# Patient Record
Sex: FEMALE | Race: WHITE | NOT HISPANIC OR LATINO | Employment: OTHER | URBAN - METROPOLITAN AREA
[De-identification: names, ages, dates, MRNs, and addresses within clinical notes are randomized per-mention and may not be internally consistent; named-entity substitution may affect disease eponyms.]

---

## 2017-01-17 ENCOUNTER — ALLIED HEALTH/NURSE VISIT (OUTPATIENT)
Dept: NURSING | Facility: CLINIC | Age: 37
End: 2017-01-17
Payer: MEDICAID

## 2017-01-17 DIAGNOSIS — Z23 NEED FOR VACCINATION: Primary | ICD-10-CM

## 2017-01-17 PROCEDURE — 90471 IMMUNIZATION ADMIN: CPT

## 2017-01-17 PROCEDURE — 90715 TDAP VACCINE 7 YRS/> IM: CPT

## 2017-12-03 ENCOUNTER — HEALTH MAINTENANCE LETTER (OUTPATIENT)
Age: 37
End: 2017-12-03

## 2018-12-04 ENCOUNTER — HEALTH MAINTENANCE LETTER (OUTPATIENT)
Age: 38
End: 2018-12-04

## 2020-02-03 ENCOUNTER — OFFICE VISIT (OUTPATIENT)
Dept: FAMILY MEDICINE | Facility: CLINIC | Age: 40
End: 2020-02-03
Payer: COMMERCIAL

## 2020-02-03 VITALS
WEIGHT: 175 LBS | DIASTOLIC BLOOD PRESSURE: 70 MMHG | BODY MASS INDEX: 29.16 KG/M2 | HEIGHT: 65 IN | OXYGEN SATURATION: 97 % | TEMPERATURE: 98.1 F | SYSTOLIC BLOOD PRESSURE: 96 MMHG | HEART RATE: 78 BPM

## 2020-02-03 DIAGNOSIS — F33.1 MODERATE EPISODE OF RECURRENT MAJOR DEPRESSIVE DISORDER (H): ICD-10-CM

## 2020-02-03 DIAGNOSIS — Z13.1 SCREENING FOR DIABETES MELLITUS: ICD-10-CM

## 2020-02-03 DIAGNOSIS — R32 URINARY INCONTINENCE, UNSPECIFIED TYPE: ICD-10-CM

## 2020-02-03 DIAGNOSIS — N64.4 PAIN OF BOTH BREASTS: ICD-10-CM

## 2020-02-03 DIAGNOSIS — Z13.220 LIPID SCREENING: ICD-10-CM

## 2020-02-03 DIAGNOSIS — Z01.419 ENCOUNTER FOR GYNECOLOGICAL EXAMINATION WITHOUT ABNORMAL FINDING: ICD-10-CM

## 2020-02-03 DIAGNOSIS — Z00.00 ROUTINE GENERAL MEDICAL EXAMINATION AT A HEALTH CARE FACILITY: Primary | ICD-10-CM

## 2020-02-03 DIAGNOSIS — R06.83 SNORING: ICD-10-CM

## 2020-02-03 DIAGNOSIS — G47.9 SLEEP DIFFICULTIES: ICD-10-CM

## 2020-02-03 LAB
CHOLEST SERPL-MCNC: 256 MG/DL
GLUCOSE SERPL-MCNC: 92 MG/DL (ref 70–99)
HDLC SERPL-MCNC: 64 MG/DL
LDLC SERPL CALC-MCNC: 173 MG/DL
NONHDLC SERPL-MCNC: 192 MG/DL
TRIGL SERPL-MCNC: 96 MG/DL

## 2020-02-03 PROCEDURE — G0145 SCR C/V CYTO,THINLAYER,RESCR: HCPCS | Performed by: FAMILY MEDICINE

## 2020-02-03 PROCEDURE — 82947 ASSAY GLUCOSE BLOOD QUANT: CPT | Performed by: FAMILY MEDICINE

## 2020-02-03 PROCEDURE — G0476 HPV COMBO ASSAY CA SCREEN: HCPCS | Performed by: FAMILY MEDICINE

## 2020-02-03 PROCEDURE — 80061 LIPID PANEL: CPT | Performed by: FAMILY MEDICINE

## 2020-02-03 PROCEDURE — 87624 HPV HI-RISK TYP POOLED RSLT: CPT | Performed by: FAMILY MEDICINE

## 2020-02-03 PROCEDURE — 36415 COLL VENOUS BLD VENIPUNCTURE: CPT | Performed by: FAMILY MEDICINE

## 2020-02-03 PROCEDURE — 96127 BRIEF EMOTIONAL/BEHAV ASSMT: CPT | Performed by: FAMILY MEDICINE

## 2020-02-03 PROCEDURE — 99213 OFFICE O/P EST LOW 20 MIN: CPT | Mod: 25 | Performed by: FAMILY MEDICINE

## 2020-02-03 PROCEDURE — 99395 PREV VISIT EST AGE 18-39: CPT | Performed by: FAMILY MEDICINE

## 2020-02-03 RX ORDER — ESCITALOPRAM OXALATE 20 MG/1
TABLET ORAL
COMMUNITY
Start: 2019-12-11 | End: 2021-09-01

## 2020-02-03 ASSESSMENT — ENCOUNTER SYMPTOMS
NERVOUS/ANXIOUS: 0
HEMATURIA: 0
CONSTIPATION: 0
FEVER: 0
ABDOMINAL PAIN: 0
CHILLS: 0
EYE PAIN: 0
DIARRHEA: 0
DIZZINESS: 0
HEMATOCHEZIA: 0
COUGH: 0

## 2020-02-03 ASSESSMENT — MIFFLIN-ST. JEOR: SCORE: 1471.73

## 2020-02-03 ASSESSMENT — PATIENT HEALTH QUESTIONNAIRE - PHQ9: SUM OF ALL RESPONSES TO PHQ QUESTIONS 1-9: 7

## 2020-02-03 NOTE — PROGRESS NOTES
SUBJECTIVE:   CC: Gini Valenzuela is an 39 year old woman who presents for preventive health visit.     Healthy Habits:     Getting at least 3 servings of Calcium per day:  Yes    Bi-annual eye exam:  Yes    Dental care twice a year:  Yes    Sleep apnea or symptoms of sleep apnea:  Excessive snoring    Diet:  Regular (no restrictions)    Frequency of exercise:  1 day/week    Duration of exercise:  Less than 15 minutes    Taking medications regularly:  Yes    Medication side effects:  None    PHQ-2 Total Score: 2    Additional concerns today:  Yes  No apnea or excessive daytime drowsiness.   She is currently taking Lexapro 20 mg daily for MDD. She is being followed by psychiatrist.   Sometimes wakes up at night and has difficulty with sleep.     Today's PHQ-2 Score:   PHQ-2 ( 1999 Pfizer) 2/3/2020   Q1: Little interest or pleasure in doing things 1   Q2: Feeling down, depressed or hopeless 1   PHQ-2 Score 2   Q1: Little interest or pleasure in doing things Several days   Q2: Feeling down, depressed or hopeless Several days   PHQ-2 Score 2       Abuse: Current or Past(Physical, Sexual or Emotional)- No  Do you feel safe in your environment? Yes        Social History     Tobacco Use     Smoking status: Former Smoker     Smokeless tobacco: Never Used   Substance Use Topics     Alcohol use: No     Alcohol/week: 0.0 standard drinks         Alcohol Use 2/3/2020   Prescreen: >3 drinks/day or >7 drinks/week? No   Prescreen: >3 drinks/day or >7 drinks/week? -       Reviewed orders with patient.  Reviewed health maintenance and updated orders accordingly - Yes  Labs reviewed in EPIC    Mammogram not appropriate for this patient based on age.    Pertinent mammograms are reviewed under the imaging tab.  History of abnormal Pap smear: YES - updated in Problem List and Health Maintenance accordingly  PAP / HPV 5/16/2016 2/6/2014 1/6/2011   PAP NIL NIL NIL   PAP DATE - [facility] - - 1/6/11     Reviewed and updated as needed this visit  "by clinical staff       Reviewed and updated as needed this visit by Provider          Review of Systems   Constitutional: Negative for chills and fever.   HENT: Negative for congestion and ear pain.    Eyes: Negative for pain.   Respiratory: Negative for cough.    Cardiovascular: Negative for chest pain.   Gastrointestinal: Negative for abdominal pain, constipation, diarrhea and hematochezia.   Genitourinary: Negative for hematuria.   Neurological: Negative for dizziness.   Psychiatric/Behavioral: The patient is not nervous/anxious.      Urinary incontinence - Since delivering three years ago she has been having bladder issues. She is currently wearing a pad every day due to incontinence.     She weaned breastfeeding in Sept 2019. For the last month, she has been experiencing breast soreness on the outer breasts. Pain is mild and constant. No lumps, skin changes or nipple drainage. She stopped breast feeding. Maternal first cousin has breast cancer.      OBJECTIVE:   Physical Exam   BP 96/70   Pulse 78   Temp 98.1  F (36.7  C) (Oral)   Ht 1.638 m (5' 4.5\")   Wt 79.4 kg (175 lb)   LMP 02/01/2020 (Exact Date)   HC 18 cm (7.09\")   SpO2 97%   BMI 29.57 kg/m    GENERAL: healthy, alert and no distress  EYES: Eyes grossly normal to inspection, PERRL and conjunctivae and sclerae normal  HENT: ear canals and TM's normal, nose and mouth without ulcers or lesions  NECK: no adenopathy, no asymmetry, masses, or scars and thyroid normal to palpation  RESP: lungs clear to auscultation - no rales, rhonchi or wheezes  BREAST: normal without masses, tenderness or nipple discharge and no palpable axillary masses or adenopathy  CV: regular rate and rhythm, normal S1 S2  ABDOMEN: soft, nontender, no hepatosplenomegaly, no masses and bowel sounds normal   (female): normal female external genitalia, normal urethral meatus, vaginal mucosa pink, moist, well rugated, and normal cervix without masses or discharge  MS: no gross " "musculoskeletal defects noted, no edema  SKIN: no suspicious lesions or rashes  NEURO: Normal strength and tone, mentation intact and speech normal  PSYCH: mentation appears normal, affect normal/bright    Diagnostic Test Results:  Labs reviewed in Epic    ASSESSMENT/PLAN:     1. Routine general medical examination at a health care facility    2. Encounter for gynecological examination without abnormal finding  - Pap imaged thin layer screen with HPV - recommended age 30 - 65 years (select HPV order below)  - HPV High Risk Types DNA Cervical    3. Moderate episode of recurrent major depressive disorder (H)  - followed by psychiatrist     4. Snoring  - SLEEP EVALUATION & MANAGEMENT REFERRAL - [facility] [place] [phone] (Age 15 and up); Future    5. Lipid screening  - Lipid Profile (Chol, Trig, HDL, LDL calc)    6. Screening for diabetes mellitus  - Glucose    7. Urinary incontinence, unspecified type  - recommended PT, if symptoms are not improving tehn I  - KATHRYN PT, HAND, AND CHIROPRACTIC REFERRAL; Future      8. Pain of both breasts  - ordered US/mammogram for further evaluation   - US Breast Bilateral Complete 4 Quadrants; Future  - MA Diagnostic Digital Bilateral; Future    9. Sleep difficulties  - advised below  Melatonin 5 to 10 mg, 30 minutes before going to bed.   If symptoms are not improving then you can discuss with your psychiatrist about adding Trazodone to help with sleep.       COUNSELING:  Reviewed preventive health counseling, as reflected in patient instructions    Estimated body mass index is 31.34 kg/m  as calculated from the following:    Height as of 11/21/16: 1.632 m (5' 4.25\").    Weight as of 11/21/16: 83.5 kg (184 lb).    Weight management plan: Discussed healthy diet and exercise guidelines     reports that she has quit smoking. She has never used smokeless tobacco.      Counseling Resources:  ATP IV Guidelines  Pooled Cohorts Equation Calculator  Breast Cancer " Risk Calculator  FRAX Risk Assessment  ICSI Preventive Guidelines  Dietary Guidelines for Americans, 2010  USDA's MyPlate  ASA Prophylaxis  Lung CA Screening    Brooklyn Umana MD  [facility]

## 2020-02-03 NOTE — PATIENT INSTRUCTIONS
Sleep -   Melatonin 5 to 10 mg, 30 minutes before going to bed.   If symptoms are not improving then you can discuss with your psychiatrist about adding Trazodone to help with sleep.       Mammogram [phone]      Preventive Health Recommendations  Female Ages 26 - 39  Yearly exam:   See your health care provider every year in order to    Review health changes.     Discuss preventive care.      Review your medicines if you your doctor has prescribed any.    Until age 30: Get a Pap test every three years (more often if you have had an abnormal result).    After age 30: Talk to your doctor about whether you should have a Pap test every 3 years or have a Pap test with HPV screening every 5 years.   You do not need a Pap test if your uterus was removed (hysterectomy) and you have not had cancer.  You should be tested each year for STDs (sexually transmitted diseases), if you're at risk.   Talk to your provider about how often to have your cholesterol checked.  If you are at risk for diabetes, you should have a diabetes test (fasting glucose).  Shots: Get a flu shot each year. Get a tetanus shot every 10 years.   Nutrition:     Eat at least 5 servings of fruits and vegetables each day.    Eat whole-grain bread, whole-wheat pasta and brown rice instead of white grains and rice.    Get adequate Calcium and Vitamin D.     Lifestyle    Exercise at least 150 minutes a week (30 minutes a day, 5 days of the week). This will help you control your weight and prevent disease.    Limit alcohol to one drink per day.    No smoking.     Wear sunscreen to prevent skin cancer.    See your dentist every six months for an exam and cleaning.

## 2020-02-05 LAB
COPATH REPORT: NORMAL
PAP: NORMAL

## 2020-02-06 LAB
FINAL DIAGNOSIS: NORMAL
HPV HR 12 DNA CVX QL NAA+PROBE: NEGATIVE
HPV16 DNA SPEC QL NAA+PROBE: NEGATIVE
HPV18 DNA SPEC QL NAA+PROBE: NEGATIVE
SPECIMEN DESCRIPTION: NORMAL
SPECIMEN SOURCE CVX/VAG CYTO: NORMAL

## 2020-02-10 ENCOUNTER — ANCILLARY PROCEDURE (OUTPATIENT)
Dept: MAMMOGRAPHY | Facility: CLINIC | Age: 40
End: 2020-02-10
Attending: FAMILY MEDICINE
Payer: COMMERCIAL

## 2020-02-10 DIAGNOSIS — N64.4 PAIN OF BOTH BREASTS: ICD-10-CM

## 2020-03-02 ENCOUNTER — HEALTH MAINTENANCE LETTER (OUTPATIENT)
Age: 40
End: 2020-03-02

## 2020-09-10 ENCOUNTER — ANCILLARY PROCEDURE (OUTPATIENT)
Dept: MAMMOGRAPHY | Facility: CLINIC | Age: 40
End: 2020-09-10
Attending: FAMILY MEDICINE
Payer: COMMERCIAL

## 2020-09-10 DIAGNOSIS — R92.1 BREAST CALCIFICATIONS: ICD-10-CM

## 2020-09-10 DIAGNOSIS — R92.8 CATEGORY 3 MAMMOGRAPHY RESULT WITH SHORT FOLLOW-UP INTERVAL SUGGESTED FOR PROBABLY BENIGN FINDING: ICD-10-CM

## 2020-12-20 ENCOUNTER — HEALTH MAINTENANCE LETTER (OUTPATIENT)
Age: 40
End: 2020-12-20

## 2021-03-19 ENCOUNTER — ANCILLARY PROCEDURE (OUTPATIENT)
Dept: MAMMOGRAPHY | Facility: CLINIC | Age: 41
End: 2021-03-19
Attending: FAMILY MEDICINE
Payer: COMMERCIAL

## 2021-03-19 DIAGNOSIS — R92.8 CATEGORY 3 MAMMOGRAPHY RESULT WITH SHORT FOLLOW-UP INTERVAL SUGGESTED FOR PROBABLY BENIGN FINDING: ICD-10-CM

## 2021-03-19 DIAGNOSIS — R92.1 BREAST CALCIFICATIONS: ICD-10-CM

## 2021-03-19 PROCEDURE — G0279 TOMOSYNTHESIS, MAMMO: HCPCS

## 2021-03-19 PROCEDURE — 77066 DX MAMMO INCL CAD BI: CPT

## 2021-04-24 ENCOUNTER — HEALTH MAINTENANCE LETTER (OUTPATIENT)
Age: 41
End: 2021-04-24

## 2021-09-01 ENCOUNTER — OFFICE VISIT (OUTPATIENT)
Dept: FAMILY MEDICINE | Facility: CLINIC | Age: 41
End: 2021-09-01
Payer: COMMERCIAL

## 2021-09-01 VITALS
BODY MASS INDEX: 27.79 KG/M2 | WEIGHT: 166.8 LBS | HEIGHT: 65 IN | HEART RATE: 89 BPM | DIASTOLIC BLOOD PRESSURE: 76 MMHG | SYSTOLIC BLOOD PRESSURE: 109 MMHG | RESPIRATION RATE: 16 BRPM | TEMPERATURE: 98.7 F | OXYGEN SATURATION: 100 %

## 2021-09-01 DIAGNOSIS — F39 MOOD DISORDER (H): ICD-10-CM

## 2021-09-01 DIAGNOSIS — Z00.00 ROUTINE GENERAL MEDICAL EXAMINATION AT A HEALTH CARE FACILITY: Primary | ICD-10-CM

## 2021-09-01 DIAGNOSIS — Z98.890 S/P LEEP OF CERVIX: ICD-10-CM

## 2021-09-01 DIAGNOSIS — L29.9 ITCHING: ICD-10-CM

## 2021-09-01 DIAGNOSIS — Z78.9 ALCOHOL USE: ICD-10-CM

## 2021-09-01 DIAGNOSIS — F41.9 ANXIETY: ICD-10-CM

## 2021-09-01 DIAGNOSIS — Z23 HIGH PRIORITY FOR 2019-NCOV VACCINE: ICD-10-CM

## 2021-09-01 DIAGNOSIS — B36.0 TINEA VERSICOLOR DUE TO MALASSEZIA FURFUR: ICD-10-CM

## 2021-09-01 PROCEDURE — 80076 HEPATIC FUNCTION PANEL: CPT | Performed by: FAMILY MEDICINE

## 2021-09-01 PROCEDURE — 36415 COLL VENOUS BLD VENIPUNCTURE: CPT | Performed by: FAMILY MEDICINE

## 2021-09-01 PROCEDURE — 99395 PREV VISIT EST AGE 18-39: CPT | Performed by: FAMILY MEDICINE

## 2021-09-01 PROCEDURE — 99213 OFFICE O/P EST LOW 20 MIN: CPT | Mod: 25 | Performed by: FAMILY MEDICINE

## 2021-09-01 RX ORDER — LORATADINE 10 MG/1
10 TABLET ORAL DAILY
Qty: 30 TABLET | Refills: 1 | Status: SHIPPED | OUTPATIENT
Start: 2021-09-01 | End: 2024-02-13

## 2021-09-01 RX ORDER — KETOCONAZOLE 20 MG/G
CREAM TOPICAL DAILY
Qty: 60 G | Refills: 3 | Status: SHIPPED | OUTPATIENT
Start: 2021-09-01 | End: 2021-11-03

## 2021-09-01 ASSESSMENT — ENCOUNTER SYMPTOMS
FREQUENCY: 0
CONSTIPATION: 0
PARESTHESIAS: 0
HEMATURIA: 0
PALPITATIONS: 0
FEVER: 0
NAUSEA: 0
HEARTBURN: 0
ARTHRALGIAS: 1
WEAKNESS: 0
SORE THROAT: 0
EYE PAIN: 0
DYSURIA: 0
HEMATOCHEZIA: 0
ABDOMINAL PAIN: 0
JOINT SWELLING: 0
MYALGIAS: 0
NERVOUS/ANXIOUS: 0
CHILLS: 0
DIZZINESS: 0
COUGH: 0
BREAST MASS: 0
HEADACHES: 0
SHORTNESS OF BREATH: 0
DIARRHEA: 0

## 2021-09-01 ASSESSMENT — MIFFLIN-ST. JEOR: SCORE: 1429.54

## 2021-09-01 NOTE — PROGRESS NOTES
SUBJECTIVE:   CC: Gini Valenzuela is an 40 year old woman who presents for preventive health visit.       Patient has been advised of split billing requirements and indicates understanding: Yes  Healthy Habits:     Getting at least 3 servings of Calcium per day:  Yes    Bi-annual eye exam:  Yes    Dental care twice a year:  Yes    Sleep apnea or symptoms of sleep apnea:  None    Diet:  Regular (no restrictions)    Frequency of exercise:  None    Taking medications regularly:  Yes    Medication side effects:  Lightheadedness    PHQ-2 Total Score: 2    Additional concerns today:  Yes  Generalized itching for yrs- to the point ends up with wounds at different location.  Today has abrasion left cheek, both arms and left leg.  Noted patchy rash- like a fungus on the back - that does not itch.    History of head injuries  She is wondering if she had many concussion  Hard falls- last one 2019- fell on concrete off a deep dumpster- later went to   because elbow was hurting.  Initial fall landed on head without loss of concisouness    Admits she has always had  anxiety/ inattention.  Counseling and taking medications- follows provider at [facility]-effexor & wellbutrin- ? exploring if has ADHD  obssessive thoughts- wants to check out- then drinks a few in aftternoon nap time- a couple bears    She has been in therapy and feels its been helpful-A lot of process in therapy- ready to admit hard to accept.   facts about self  - energy shift, openness to all the wounds- so a lot is going on.   General health, poor knees, cramps getting worse when ovulating and menstruating    Today's PHQ-2 Score:   PHQ-2 ( 1999 Pfizer) 9/1/2021   Q1: Little interest or pleasure in doing things 1   Q2: Feeling down, depressed or hopeless 1   PHQ-2 Score 2   Q1: Little interest or pleasure in doing things Several days   Q2: Feeling down, depressed or hopeless Several days   PHQ-2 Score 2       Abuse: Current or Past (Physical, Sexual or  Emotional) - No  Do you feel safe in your environment? Yes    Have you ever done Advance Care Planning? (For example, a Health Directive, POLST, or a discussion with a medical provider or your loved ones about your wishes): No, advance care planning information given to patient to review.  Advanced care planning was discussed at today's visit.    Social History     Tobacco Use     Smoking status: Former Smoker     Smokeless tobacco: Never Used   Substance Use Topics     Alcohol use: No     Alcohol/week: 0.0 standard drinks     If you drink alcohol do you typically have >3 drinks per day or >7 drinks per week? Yes     Alcohol Use 9/1/2021   Prescreen: >3 drinks/day or >7 drinks/week? Yes   Prescreen: >3 drinks/day or >7 drinks/week? -   AUDIT SCORE  16     AUDIT - Alcohol Use Disorders Identification Test - Reproduced from the World Health Organization Audit 2001 (Second Edition) 9/1/2021   1.  How often do you have a drink containing alcohol? 4 or more times a week   2.  How many drinks containing alcohol do you have on a typical day when you are drinking? 3 or 4   3.  How often do you have five or more drinks on one occasion? Monthly   4.  How often during the last year have you found that you were not able to stop drinking once you had started? Less than monthly   5.  How often during the last year have you failed to do what was normally expected of you because of drinking? Less than monthly   6.  How often during the last year have you needed a first drink in the morning to get yourself going after a heavy drinking session? Never   7.  How often during the last year have you had a feeling of guilt or remorse after drinking? Monthly   8.  How often during the last year have you been unable to remember what happened the night before because of your drinking? Less than monthly   9.  Have you or someone else been injured because of your drinking? No   10. Has a relative, friend, doctor or other health care worker  been concerned about your drinking or suggested you cut down? Yes, during the last year   TOTAL SCORE 16       Reviewed orders with patient.  Reviewed health maintenance and updated orders accordingly - Yes  BP Readings from Last 3 Encounters:   09/01/21 109/76   02/03/20 96/70   11/21/16 102/50    Wt Readings from Last 3 Encounters:   09/01/21 75.7 kg (166 lb 12.8 oz)   02/03/20 79.4 kg (175 lb)   11/21/16 83.5 kg (184 lb)                    Breast Cancer Screening:  Any new diagnosis of family breast, ovarian, or bowel cancer? No    FHS-7: No flowsheet data found.  click delete button to remove this line now  .  Pertinent mammograms are reviewed under the imaging tab.    History of abnormal Pap smear: NO - age 30-65 PAP every 5 years with negative HPV co-testing recommended  PAP / HPV Latest Ref Rng & Units 2/3/2020 5/16/2016 2/6/2014   PAP (Historical) - NIL NIL NIL   HPV16 NEG:Negative Negative - -   HPV18 NEG:Negative Negative - -   HRHPV NEG:Negative Negative - -     Reviewed and updated as needed this visit by clinical staff  Tobacco  Allergies  Meds   Med Hx  Surg Hx  Fam Hx  Soc Hx        Reviewed and updated as needed this visit by Provider                    Review of Systems   Constitutional: Negative for chills and fever.   HENT: Negative for congestion, ear pain, hearing loss and sore throat.    Eyes: Negative for pain and visual disturbance.   Respiratory: Negative for cough and shortness of breath.    Cardiovascular: Negative for chest pain, palpitations and peripheral edema.   Gastrointestinal: Negative for abdominal pain, constipation, diarrhea, heartburn, hematochezia and nausea.   Breasts:  Negative for tenderness, breast mass and discharge.   Genitourinary: Negative for dysuria, frequency, genital sores, hematuria, pelvic pain, urgency, vaginal bleeding and vaginal discharge.   Musculoskeletal: Positive for arthralgias. Negative for joint swelling and myalgias.   Skin: Negative for rash.  "  Neurological: Negative for dizziness, weakness, headaches and paresthesias.   Psychiatric/Behavioral: Negative for mood changes. The patient is not nervous/anxious.           OBJECTIVE:   /76   Pulse 89   Temp 98.7  F (37.1  C)   Resp 16   Ht 1.638 m (5' 4.5\")   Wt 75.7 kg (166 lb 12.8 oz)   SpO2 100%   BMI 28.19 kg/m    Physical Exam  GENERAL: healthy, alert and no distress  EYES: Eyes grossly normal to inspection, PERRL and conjunctivae and sclerae normal  HENT: ear canals and TM's normal, nose and mouth without ulcers or lesions  NECK: no adenopathy, no asymmetry, masses, or scars and thyroid normal to palpation  RESP: lungs clear to auscultation - no rales, rhonchi or wheezes  BREAST: normal without masses, tenderness or nipple discharge and no palpable axillary masses or adenopathy  CV: regular rate and rhythm, normal S1 S2, no S3 or S4, no murmur, click or rub, no peripheral edema and peripheral pulses strong  ABDOMEN: soft, nontender, no hepatosplenomegaly, no masses and bowel sounds normal  MS: no gross musculoskeletal defects noted, no edema  SKIN:cial abrasion /excoriation on left cheek, both limbs, both arm- sporadic - none on the back or unreachable area on the body.  On upper back hyperpigmented patches.    NEURO: Normal strength and tone, mentation intact and speech normal  PSYCH: mentation appears normal, affect normal/bright    Diagnostic Test Results:  Labs reviewed in Epic  Results for orders placed or performed in visit on 09/01/21   Hepatic panel (Albumin, ALT, AST, Bili, Alk Phos, TP)     Status: Normal   Result Value Ref Range    Bilirubin Total 0.4 0.2 - 1.3 mg/dL    Bilirubin Direct 0.1 0.0 - 0.2 mg/dL    Protein Total 6.9 6.8 - 8.8 g/dL    Albumin 3.7 3.4 - 5.0 g/dL    Alkaline Phosphatase 68 40 - 150 U/L    AST 14 0 - 45 U/L    ALT 27 0 - 50 U/L       ASSESSMENT/PLAN:   Gini was seen today for physical.    Diagnoses and all orders for this visit:    Routine general medical " "examination at a health care facility  nexp pap due in 3 yrs-02/2023    High priority for 2019-nCoV vaccine  Counseled to get COVID vaccine. Patient declined.    S/P LEEP of cervix  Comments:  2009: LEEP  no records to review. 1/6/11: Pap - NIL2/6/14: Pap - NIL, Neg HPV  5/16/16: Pap - NIL. 02/2020-NIL.next due 02/03/2023    Anxiety  Mood disorder (H)  Comments:  [facility]-effexor & wellbutrin- ? exploring if has ADHD    Tinea versicolor due to Malassezia furfur  -     ketoconazole (NIZORAL) 2 % external cream; Apply topically daily  Potential medication side effects were discussed with the patient; let me know if any occur.  If not improving - see derm  Potential medication side effects were discussed with the patient; let me know if any occur.        Alcohol use  -     Hepatic panel (Albumin, ALT, AST, Bili, Alk Phos, TP)  Advised to decrease alcohol consumption    Itching  -  Unknown cause  ? Anxiety ? Dry skin? No known allergen  Try over the counter antihistamine  Avoid hot water.  If not improving proceed with derm refferal.      Adult Dermatology Referral; Future  -     loratadine (CLARITIN) 10 MG tablet; Take 1 tablet (10 mg) by mouth daily        Patient has been advised of split billing requirements and indicates understanding: Yes  COUNSELING:  Reviewed preventive health counseling, as reflected in patient instructions       Regular exercise       Vision screening       Hearing screening       Family planning       Folic Acid       Advance Care Planning    Estimated body mass index is 28.19 kg/m  as calculated from the following:    Height as of this encounter: 1.638 m (5' 4.5\").    Weight as of this encounter: 75.7 kg (166 lb 12.8 oz).    Weight management plan: Discussed healthy diet and exercise guidelines    She reports that she has quit smoking. She has never used smokeless tobacco.      Counseling Resources:  ATP IV Guidelines  Pooled Cohorts Equation Calculator  Breast Cancer Risk " Calculator  BRCA-Related Cancer Risk Assessment: FHS-7 Tool  FRAX Risk Assessment  ICSI Preventive Guidelines  Dietary Guidelines for Americans, 2010  [employer]'s MyPlate  ASA Prophylaxis  Lung CA Screening    Samara Borges MD  [facility]

## 2021-09-02 LAB
ALBUMIN SERPL-MCNC: 3.7 G/DL (ref 3.4–5)
ALP SERPL-CCNC: 68 U/L (ref 40–150)
ALT SERPL W P-5'-P-CCNC: 27 U/L (ref 0–50)
AST SERPL W P-5'-P-CCNC: 14 U/L (ref 0–45)
BILIRUB DIRECT SERPL-MCNC: 0.1 MG/DL (ref 0–0.2)
BILIRUB SERPL-MCNC: 0.4 MG/DL (ref 0.2–1.3)
PROT SERPL-MCNC: 6.9 G/DL (ref 6.8–8.8)

## 2021-10-03 ENCOUNTER — HEALTH MAINTENANCE LETTER (OUTPATIENT)
Age: 41
End: 2021-10-03

## 2021-10-22 ENCOUNTER — MYC MEDICAL ADVICE (OUTPATIENT)
Dept: FAMILY MEDICINE | Facility: CLINIC | Age: 41
End: 2021-10-22

## 2021-11-02 NOTE — PROGRESS NOTES
"  Assessment & Plan       Visit for confirmation of pregnancy test result with limited  physical exam   41 year old female for pregnancy confirmation  She has plans to establish care with OBG/GYN  Previous uneventful pregnancy.  Current planned pregnancy      Last menstural period   Expected date of delivery 22  Today 7wk 4d  Has been on  prenatal vitamins  - HCG Qual, Urine - [facility],  RangePeter  ([id number])    14 Weeks Single; Future    Elderly multigravida, currently pregnant in first trimester  - Ob/Gyn Referral; Future      Return in about 4 weeks (around 2021) for establish PNC with OB.    Samara Borges MD  [facility]    Aiden Rubalcava is a 41 year old who presents for the following health issues  accompanied by her (almost 6 yo) daughter.    HPI     Patient is here for pregnancy confirmation, has taken 1 test at home. First day of last menstrual cycle was on .  Morning sickness as expected  Planned pregnancy  Wants to establish care with OBG/GYN      Review of Systems   Constitutional, HEENT, cardiovascular, pulmonary, GI, , musculoskeletal, neuro, skin, endocrine and psych systems are negative, except as otherwise noted.      Objective    /83   Pulse 92   Temp 97  F (36.1  C) (Temporal)   Ht 1.621 m (5' 3.82\")   Wt 73.9 kg (163 lb)   LMP 2021   SpO2 99%   BMI 28.14 kg/m    Body mass index is 28.14 kg/m .  Physical Exam   GENERAL: healthy, alert and no distress  ABDOMEN: soft, nontender, no hepatosplenomegaly, no masses and bowel sounds normal    Results for orders placed or performed in visit on 21 (from the past 24 hour(s))   HCG Qual, Urine - [facility],  Range, [place]  ([id number])   Result Value Ref Range    hCG Urine Qualitative Positive (A) Negative               "

## 2021-11-03 ENCOUNTER — OFFICE VISIT (OUTPATIENT)
Dept: FAMILY MEDICINE | Facility: CLINIC | Age: 41
End: 2021-11-03
Payer: COMMERCIAL

## 2021-11-03 VITALS
SYSTOLIC BLOOD PRESSURE: 134 MMHG | TEMPERATURE: 97 F | HEART RATE: 92 BPM | WEIGHT: 163 LBS | HEIGHT: 64 IN | DIASTOLIC BLOOD PRESSURE: 83 MMHG | OXYGEN SATURATION: 99 % | BODY MASS INDEX: 27.83 KG/M2

## 2021-11-03 DIAGNOSIS — Z32.00 VISIT FOR CONFIRMATION OF PREGNANCY TEST RESULT WITH PHYSICAL EXAM: Primary | ICD-10-CM

## 2021-11-03 DIAGNOSIS — O09.521 ELDERLY MULTIGRAVIDA, CURRENTLY PREGNANT IN FIRST TRIMESTER: ICD-10-CM

## 2021-11-03 LAB — HCG UR QL: POSITIVE

## 2021-11-03 PROCEDURE — 90686 IIV4 VACC NO PRSV 0.5 ML IM: CPT | Performed by: FAMILY MEDICINE

## 2021-11-03 PROCEDURE — 99213 OFFICE O/P EST LOW 20 MIN: CPT | Mod: 25 | Performed by: FAMILY MEDICINE

## 2021-11-03 PROCEDURE — 81025 URINE PREGNANCY TEST: CPT | Performed by: FAMILY MEDICINE

## 2021-11-03 PROCEDURE — 90471 IMMUNIZATION ADMIN: CPT | Performed by: FAMILY MEDICINE

## 2021-11-03 RX ORDER — FOLIC ACID 0.8 MG
800 TABLET ORAL DAILY
Qty: 90 TABLET | Refills: 3 | Status: SHIPPED | OUTPATIENT
Start: 2021-11-03 | End: 2024-02-13

## 2021-11-03 ASSESSMENT — MIFFLIN-ST. JEOR: SCORE: 1396.49

## 2021-11-03 NOTE — PATIENT INSTRUCTIONS
Last menstural period 9/13  Expected date of delivery 6/20/22  Today 7wk 4d  Start prenatal vitamins  See OBG/GYN within next 3-4 weeks  Call to schedule your imaging:Sd [phone]

## 2021-12-07 LAB
HEP C HIM: NORMAL
HIV 1&2 EXT: NORMAL

## 2022-01-20 ENCOUNTER — MEDICAL CORRESPONDENCE (OUTPATIENT)
Dept: HEALTH INFORMATION MANAGEMENT | Facility: CLINIC | Age: 42
End: 2022-01-20
Payer: COMMERCIAL

## 2022-01-21 ENCOUNTER — TRANSCRIBE ORDERS (OUTPATIENT)
Dept: MATERNAL FETAL MEDICINE | Facility: CLINIC | Age: 42
End: 2022-01-21
Payer: COMMERCIAL

## 2022-01-21 DIAGNOSIS — O26.90 PREGNANCY RELATED CONDITION, ANTEPARTUM: Primary | ICD-10-CM

## 2022-02-01 ENCOUNTER — PRE VISIT (OUTPATIENT)
Dept: MATERNAL FETAL MEDICINE | Facility: CLINIC | Age: 42
End: 2022-02-01
Payer: COMMERCIAL

## 2022-02-08 ENCOUNTER — HOSPITAL ENCOUNTER (OUTPATIENT)
Dept: ULTRASOUND IMAGING | Facility: CLINIC | Age: 42
End: 2022-02-08
Attending: ADVANCED PRACTICE MIDWIFE
Payer: COMMERCIAL

## 2022-02-08 ENCOUNTER — OFFICE VISIT (OUTPATIENT)
Dept: MATERNAL FETAL MEDICINE | Facility: CLINIC | Age: 42
End: 2022-02-08
Attending: ADVANCED PRACTICE MIDWIFE
Payer: COMMERCIAL

## 2022-02-08 DIAGNOSIS — O26.90 PREGNANCY RELATED CONDITION, ANTEPARTUM: ICD-10-CM

## 2022-02-08 DIAGNOSIS — O43.192 MARGINAL INSERTION OF UMBILICAL CORD AFFECTING MANAGEMENT OF MOTHER IN SECOND TRIMESTER: ICD-10-CM

## 2022-02-08 DIAGNOSIS — O09.522 MULTIGRAVIDA OF ADVANCED MATERNAL AGE IN SECOND TRIMESTER: Primary | ICD-10-CM

## 2022-02-08 PROCEDURE — 76811 OB US DETAILED SNGL FETUS: CPT | Mod: 26 | Performed by: OBSTETRICS & GYNECOLOGY

## 2022-02-08 PROCEDURE — 76811 OB US DETAILED SNGL FETUS: CPT

## 2022-02-08 PROCEDURE — 96040 HC GENETIC COUNSELING, EACH 30 MINUTES: CPT | Performed by: GENETIC COUNSELOR, MS

## 2022-02-08 NOTE — PROGRESS NOTES
Please refer to ultrasound report under 'Imaging' Studies of 'Chart Review' tabs.    King Eng M.D.

## 2022-02-08 NOTE — PROGRESS NOTES
[facility]  Genetic Counseling Consult    Patient:  Gini Horne YOB: 1980   Date of Service:  22      Gini Horne was seen at the [facility] for genetic consultation as part of her appointment for comprehensive ultrasound.  The indication for genetic counseling is advanced maternal age. The patient was accompanied by her partner, Pepe to today's visit.        Impression/Plan:   1. Gini had a comprehensive (level II) ultrasound today.  Please see the ultrasound report for further details.    2. Gini reported undergoing NIPT with her primary OB earlier in this pregnancy which was low risk. A copy of the result was not available for review at time of our discussion. She is aware that diagnostic testing will remain available to her.     Pregnancy History:   /Parity:    Age at Delivery: 41 year old  ERUM: 2022, by Last Menstrual Period  Gestational Age: 21w1d    No significant complications or exposures were reported in the current pregnancy.    Gini s pregnancy history is significant for one term vaginal delivery.    Medical History:   Gini burrell reported medical history is not expected to impact pregnancy management or risks to fetal development.       Family History:   A three-generation pedigree was obtained, and is scanned under the  Media  tab.   The following significant findings were reported by Gini:    Gini's partner, Pepe is 44 and healthy.   It was reported that Gini's maternal first cousin was reported to have breast cancer diagnosed at age 27. Gini's mother was reported to have skin cancer. We discussed how most cancer seen in families occurs sporadically, but about 5-10% may be due to an underlying genetic etiology. Gini is not aware of any genetic testing performed in her cousin. Gini was encouraged to share this family history information with her primary care provider to ensure appropriate  screening.  Gini's male paternal first cousin was reported to have a chromosomal or genetic condition causing social concerns. Gini is not aware of the specific diagnosis in this relative. We briefly reviewed fragile X syndrome as a more common inherited form of Autism in males. If more information is gathered regarding this individual it would be reasonable to revisit this family history for a more accurate risk assessment.  Gini's maternal grandfather reportedly passed away at age 57 due to aortic rupture. We reviewed that aortic rupture can be seen in the setting of underlying connective tissue disorders. Gini is not aware of any identified connective tissue disorder in her grandfather. Gini was encouraged to share this family history information with her primary care provider.     Otherwise, the reported family history is negative for multiple miscarriages, stillbirths, birth defects, intellectual disability, known genetic conditions, and consanguinity.       Carrier Screening:   The patient reports that she and the father of the pregnancy have  ancestry:     Cystic fibrosis is an autosomal recessive genetic condition that occurs with increased frequency in individuals of  ancestry and carrier screening for this condition is available.  In addition,  screening in the [facility] includes cystic fibrosis.      Expanded carrier screening for mutations in a large panel of genes associated with autosomal recessive conditions including cystic fibrosis, spinal muscular atrophy, and others, is now available.      Gnii believes she underwent carrier screening with her primary OB earlier in this pregnancy which was negative. A copy of the result was not available for review at time of our discussion.        Risk Assessment for Chromosome Conditions:   We explained that the risk for fetal chromosome abnormalities increases with maternal age. We discussed specific features of common chromosome  abnormalities, including Down syndrome, trisomy 13, trisomy 18, and sex chromosome trisomies.      - At age 41 at midtrimester, the risk to have a baby with Down syndrome is 1 in 98.     - At age 41 at midtrimester, the risk to have a baby with any chromosome abnormality is 1 in 51.       Gini reported undergoing NIPT with her primary OB earlier in this pregnancy which was low risk. A copy of the result was not available for review at time of our discussion. We discussed that this often includes screening for the common aneuploidies (Down syndrome, trisomy 18, and trisomy 13).        Testing Options:   We discussed the following options:   Genetic Amniocentesis    Invasive procedure typically performed in the second trimester by which amniotic fluid is obtained for the purpose of chromosome analysis and/or other prenatal genetic analysis    Diagnostic results; >99% sensitivity for fetal chromosome abnormalities    AFAFP measurement tests for open neural tube defects     Comprehensive (Level II) ultrasound: Detailed ultrasound performed between 18-22 weeks gestation to screen for major birth defects and markers for aneuploidy.      We reviewed the benefits and limitations of this testing.  Screening tests provide a risk assessment specific to the pregnancy for certain fetal chromosome abnormalities, but cannot definitively diagnose or exclude a fetal chromosome abnormality.  Follow-up genetic counseling and consideration of diagnostic testing is recommended with any abnormal screening result.     Diagnostic tests carry inherent risks- including risk of miscarriage- that require careful consideration.  These tests can detect fetal chromosome abnormalities with greater than 99% certainty.  Results can be compromised by maternal cell contamination or mosaicism, and are limited by the resolution of cytogenetic G-banding technology.  There is no screening nor diagnostic test that can detect all forms of birth defects or  mental disability.    It was a pleasure to be involved with Gini burrell care. Face-to-face time of the meeting was 20 minutes.    Luisa Ba MS, [facility]  Licensed Genetic Counselor  [facility]  Maternal Fetal Medicine  Ph: [phone]  [email or web address]

## 2022-02-11 ENCOUNTER — TRANSFERRED RECORDS (OUTPATIENT)
Dept: HEALTH INFORMATION MANAGEMENT | Facility: CLINIC | Age: 42
End: 2022-02-11
Payer: COMMERCIAL

## 2022-03-29 ENCOUNTER — OFFICE VISIT (OUTPATIENT)
Dept: MATERNAL FETAL MEDICINE | Facility: CLINIC | Age: 42
End: 2022-03-29
Attending: OBSTETRICS & GYNECOLOGY
Payer: COMMERCIAL

## 2022-03-29 ENCOUNTER — HOSPITAL ENCOUNTER (OUTPATIENT)
Dept: ULTRASOUND IMAGING | Facility: CLINIC | Age: 42
Discharge: HOME | End: 2022-03-29
Attending: OBSTETRICS & GYNECOLOGY
Payer: COMMERCIAL

## 2022-03-29 DIAGNOSIS — O09.522 MULTIGRAVIDA OF ADVANCED MATERNAL AGE IN SECOND TRIMESTER: ICD-10-CM

## 2022-03-29 DIAGNOSIS — O43.192 MARGINAL INSERTION OF UMBILICAL CORD AFFECTING MANAGEMENT OF MOTHER IN SECOND TRIMESTER: Primary | ICD-10-CM

## 2022-03-29 DIAGNOSIS — O43.192 MARGINAL INSERTION OF UMBILICAL CORD AFFECTING MANAGEMENT OF MOTHER IN SECOND TRIMESTER: ICD-10-CM

## 2022-03-29 PROCEDURE — 76816 OB US FOLLOW-UP PER FETUS: CPT

## 2022-03-29 PROCEDURE — 76816 OB US FOLLOW-UP PER FETUS: CPT | Mod: 26 | Performed by: OBSTETRICS & GYNECOLOGY

## 2022-03-29 NOTE — PROGRESS NOTES
"Please see \"Imaging\" tab under \"Chart Review\" for details of today's US.    Britt Aguilera, DO    "

## 2022-05-10 ENCOUNTER — HOSPITAL ENCOUNTER (OUTPATIENT)
Dept: ULTRASOUND IMAGING | Facility: CLINIC | Age: 42
Discharge: HOME | End: 2022-05-10
Attending: OBSTETRICS & GYNECOLOGY
Payer: COMMERCIAL

## 2022-05-10 ENCOUNTER — OFFICE VISIT (OUTPATIENT)
Dept: MATERNAL FETAL MEDICINE | Facility: CLINIC | Age: 42
End: 2022-05-10
Attending: OBSTETRICS & GYNECOLOGY
Payer: COMMERCIAL

## 2022-05-10 DIAGNOSIS — O43.192 MARGINAL INSERTION OF UMBILICAL CORD AFFECTING MANAGEMENT OF MOTHER IN SECOND TRIMESTER: ICD-10-CM

## 2022-05-10 DIAGNOSIS — O43.192 MARGINAL INSERTION OF UMBILICAL CORD AFFECTING MANAGEMENT OF MOTHER IN SECOND TRIMESTER: Primary | ICD-10-CM

## 2022-05-10 DIAGNOSIS — O09.522 MULTIGRAVIDA OF ADVANCED MATERNAL AGE IN SECOND TRIMESTER: ICD-10-CM

## 2022-05-10 PROCEDURE — 76816 OB US FOLLOW-UP PER FETUS: CPT

## 2022-05-10 PROCEDURE — 76816 OB US FOLLOW-UP PER FETUS: CPT | Mod: 26 | Performed by: OBSTETRICS & GYNECOLOGY

## 2022-05-10 NOTE — PROGRESS NOTES
"Please see \"Imaging\" tab under Chart Review for full details.    Amy Mcgrath MD  Maternal Fetal Medicine    "

## 2022-09-10 ENCOUNTER — HEALTH MAINTENANCE LETTER (OUTPATIENT)
Age: 42
End: 2022-09-10

## 2022-11-07 LAB
CHOLESTEROL (EXTERNAL): 297 MG/DL
HBA1C MFR BLD: 5 %
HDLC SERPL-MCNC: 72 MG/DL
LDL CHOLESTEROL (EXTERNAL): 204 MG/DL
NON HDL CHOLESTEROL (EXTERNAL): 225 MG/DL
TRIGLYCERIDES (EXTERNAL): 93 MG/DL
TSH SERPL-ACNC: 1.67 MIU/L

## 2022-11-08 ENCOUNTER — TRANSFERRED RECORDS (OUTPATIENT)
Dept: MULTI SPECIALTY CLINIC | Facility: CLINIC | Age: 42
End: 2022-11-08

## 2022-11-08 LAB
HPV ABSTRACT: NORMAL
PAP SMEAR - HIM PATIENT REPORTED: NEGATIVE
PAP-ABSTRACT: NORMAL

## 2022-11-17 ENCOUNTER — TRANSFERRED RECORDS (OUTPATIENT)
Dept: HEALTH INFORMATION MANAGEMENT | Facility: CLINIC | Age: 42
End: 2022-11-17

## 2023-01-16 ASSESSMENT — ENCOUNTER SYMPTOMS
CHILLS: 0
FREQUENCY: 0
HEADACHES: 0
MYALGIAS: 0
WEAKNESS: 0
BREAST MASS: 0
DIARRHEA: 0
HEMATURIA: 0
CONSTIPATION: 0
PALPITATIONS: 0
HEMATOCHEZIA: 0
SORE THROAT: 0
FEVER: 0
NERVOUS/ANXIOUS: 0
JOINT SWELLING: 0
ARTHRALGIAS: 1
DIZZINESS: 0
PARESTHESIAS: 0
HEARTBURN: 0
DYSURIA: 0
NAUSEA: 0
COUGH: 0
SHORTNESS OF BREATH: 0
EYE PAIN: 0
ABDOMINAL PAIN: 0

## 2023-01-20 ENCOUNTER — ANCILLARY PROCEDURE (OUTPATIENT)
Dept: GENERAL RADIOLOGY | Facility: CLINIC | Age: 43
End: 2023-01-20
Attending: FAMILY MEDICINE
Payer: COMMERCIAL

## 2023-01-20 ENCOUNTER — OFFICE VISIT (OUTPATIENT)
Dept: FAMILY MEDICINE | Facility: CLINIC | Age: 43
End: 2023-01-20
Payer: COMMERCIAL

## 2023-01-20 VITALS
HEIGHT: 64 IN | TEMPERATURE: 97.2 F | RESPIRATION RATE: 15 BRPM | DIASTOLIC BLOOD PRESSURE: 78 MMHG | HEART RATE: 69 BPM | BODY MASS INDEX: 33.46 KG/M2 | OXYGEN SATURATION: 99 % | SYSTOLIC BLOOD PRESSURE: 112 MMHG | WEIGHT: 196 LBS

## 2023-01-20 DIAGNOSIS — Z13.220 LIPID SCREENING: ICD-10-CM

## 2023-01-20 DIAGNOSIS — Z11.59 NEED FOR HEPATITIS C SCREENING TEST: ICD-10-CM

## 2023-01-20 DIAGNOSIS — M79.674 GREAT TOE PAIN, RIGHT: ICD-10-CM

## 2023-01-20 DIAGNOSIS — Z12.31 ENCOUNTER FOR SCREENING MAMMOGRAM FOR BREAST CANCER: ICD-10-CM

## 2023-01-20 DIAGNOSIS — F42.9 OBSESSIVE-COMPULSIVE DISORDER, UNSPECIFIED TYPE: ICD-10-CM

## 2023-01-20 DIAGNOSIS — Z12.4 CERVICAL CANCER SCREENING: ICD-10-CM

## 2023-01-20 DIAGNOSIS — M25.531 PAIN IN BOTH WRISTS: ICD-10-CM

## 2023-01-20 DIAGNOSIS — F41.1 GAD (GENERALIZED ANXIETY DISORDER): ICD-10-CM

## 2023-01-20 DIAGNOSIS — Z00.00 ROUTINE GENERAL MEDICAL EXAMINATION AT A HEALTH CARE FACILITY: ICD-10-CM

## 2023-01-20 DIAGNOSIS — Z23 HIGH PRIORITY FOR 2019-NCOV VACCINE: ICD-10-CM

## 2023-01-20 DIAGNOSIS — M25.532 PAIN IN BOTH WRISTS: ICD-10-CM

## 2023-01-20 PROCEDURE — 73660 X-RAY EXAM OF TOE(S): CPT | Mod: TC | Performed by: RADIOLOGY

## 2023-01-20 PROCEDURE — 99396 PREV VISIT EST AGE 40-64: CPT | Mod: 25 | Performed by: FAMILY MEDICINE

## 2023-01-20 PROCEDURE — 0124A COVID-19 VACCINE BIVALENT BOOSTER 12+ (PFIZER): CPT | Performed by: FAMILY MEDICINE

## 2023-01-20 PROCEDURE — 91312 COVID-19 VACCINE BIVALENT BOOSTER 12+ (PFIZER): CPT | Performed by: FAMILY MEDICINE

## 2023-01-20 PROCEDURE — 96127 BRIEF EMOTIONAL/BEHAV ASSMT: CPT | Performed by: FAMILY MEDICINE

## 2023-01-20 PROCEDURE — 90471 IMMUNIZATION ADMIN: CPT | Performed by: FAMILY MEDICINE

## 2023-01-20 PROCEDURE — 90686 IIV4 VACC NO PRSV 0.5 ML IM: CPT | Performed by: FAMILY MEDICINE

## 2023-01-20 PROCEDURE — 99214 OFFICE O/P EST MOD 30 MIN: CPT | Mod: 25 | Performed by: FAMILY MEDICINE

## 2023-01-20 RX ORDER — FLUVOXAMINE MALEATE 100 MG
TABLET ORAL
COMMUNITY
Start: 2022-12-29 | End: 2024-02-13

## 2023-01-20 ASSESSMENT — ENCOUNTER SYMPTOMS
HEARTBURN: 0
DYSURIA: 0
HEMATOCHEZIA: 0
SHORTNESS OF BREATH: 0
NAUSEA: 0
DIZZINESS: 0
DIARRHEA: 0
EYE PAIN: 0
SORE THROAT: 0
JOINT SWELLING: 0
ARTHRALGIAS: 1
FEVER: 0
HEMATURIA: 0
NERVOUS/ANXIOUS: 0
PARESTHESIAS: 0
FREQUENCY: 0
ABDOMINAL PAIN: 0
PALPITATIONS: 0
CHILLS: 0
MYALGIAS: 0
BREAST MASS: 0
HEADACHES: 0
COUGH: 0
CONSTIPATION: 0
WEAKNESS: 0

## 2023-01-20 ASSESSMENT — PATIENT HEALTH QUESTIONNAIRE - PHQ9
SUM OF ALL RESPONSES TO PHQ QUESTIONS 1-9: 5
SUM OF ALL RESPONSES TO PHQ QUESTIONS 1-9: 5
10. IF YOU CHECKED OFF ANY PROBLEMS, HOW DIFFICULT HAVE THESE PROBLEMS MADE IT FOR YOU TO DO YOUR WORK, TAKE CARE OF THINGS AT HOME, OR GET ALONG WITH OTHER PEOPLE: SOMEWHAT DIFFICULT

## 2023-01-20 ASSESSMENT — PAIN SCALES - GENERAL: PAINLEVEL: NO PAIN (0)

## 2023-01-20 NOTE — PROGRESS NOTES
SUBJECTIVE:   CC: Gini is an 42 year old who presents for preventive health visit.   Patient has been advised of split billing requirements and indicates understanding: Yes  Healthy Habits:     Getting at least 3 servings of Calcium per day:  Yes    Bi-annual eye exam:  NO    Dental care twice a year:  NO    Sleep apnea or symptoms of sleep apnea:  None    Diet:  Regular (no restrictions)    Frequency of exercise:  1 day/week    Duration of exercise:  Less than 15 minutes    Taking medications regularly:  No    Barriers to taking medications:  Problems remembering to take them    Medication side effects:  None    PHQ-2 Total Score: 0    Additional concerns today:  Yes    Six months post-partum   Two months ago she was seen by midwife. Labs showed elevated cholesterol.   Total 297, normal HDL and triglycerides,     Postpartum anxiety/OCD - Followed by psychology and psychiatrist. She has thoughts that she or her kids will die. She has pretty bad rumination. She uses cannabis, uses water pipe and uses some gummies. Sometimes she will get a weird flux in her heart which makes her feel that she will have a heart attack.     Right great toe hurts a lot. Symptoms started around 5 years ago and symptoms have remained the same. She wants to crack it but it doesn't feel better. Symptoms are present daily. No aggravating factors and sometimes feel better after cracking it. No swelling. No previous injury.     Her joints have been really sore. Her wrist and knees are sore. She did have wicked carpal tunnel six years ago during pregnancy. She does have poor circulation in her hands. Her hands will go numb when she is pushing the stroller.    Today's PHQ-2 Score:   PHQ-2 ( 1999 Pfizer) 1/16/2023   Q1: Little interest or pleasure in doing things 0   Q2: Feeling down, depressed or hopeless 0   PHQ-2 Score 0   PHQ-2 Total Score (12-17 Years)- Positive if 3 or more points; Administer PHQ-A if positive -   Q1: Little  interest or pleasure in doing things Not at all   Q2: Feeling down, depressed or hopeless Not at all   PHQ-2 Score 0       Social History     Tobacco Use     Smoking status: Former     Smokeless tobacco: Never   Substance Use Topics     Alcohol use: No     Alcohol/week: 0.0 standard drinks     If you drink alcohol do you typically have >3 drinks per day or >7 drinks per week? No    Alcohol Use 1/16/2023   Prescreen: >3 drinks/day or >7 drinks/week? No   Prescreen: >3 drinks/day or >7 drinks/week? -   AUDIT SCORE  -     AUDIT - Alcohol Use Disorders Identification Test - Reproduced from the World Health Organization Audit 2001 (Second Edition) 9/1/2021   1.  How often do you have a drink containing alcohol? 4 or more times a week   2.  How many drinks containing alcohol do you have on a typical day when you are drinking? 3 or 4   3.  How often do you have five or more drinks on one occasion? Monthly   4.  How often during the last year have you found that you were not able to stop drinking once you had started? Less than monthly   5.  How often during the last year have you failed to do what was normally expected of you because of drinking? Less than monthly   6.  How often during the last year have you needed a first drink in the morning to get yourself going after a heavy drinking session? Never   7.  How often during the last year have you had a feeling of guilt or remorse after drinking? Monthly   8.  How often during the last year have you been unable to remember what happened the night before because of your drinking? Less than monthly   9.  Have you or someone else been injured because of your drinking? No   10. Has a relative, friend, doctor or other health care worker been concerned about your drinking or suggested you cut down? Yes, during the last year   TOTAL SCORE 16     She has cut down alcohol since early pandemic. She is now mindful of alcohol use. She limits drinks to 1-2, drinks 3 times/week.  "    Reviewed orders with patient.  Reviewed health maintenance and updated orders accordingly - Yes      Breast Cancer Screening:    Breast CA Risk Assessment (FHS-7) 9/1/2021   Do you have a family history of breast, colon, or ovarian cancer? No / Unknown           Pertinent mammograms are reviewed under the imaging tab.    History of abnormal Pap smear: NO - age 30-65 PAP every 5 years with negative HPV co-testing recommended  PAP / HPV Latest Ref Rng & Units 2/3/2020 5/16/2016 2/6/2014   PAP (Historical) - NIL NIL NIL   HPV16 NEG:Negative Negative - -   HPV18 NEG:Negative Negative - -   HRHPV NEG:Negative Negative - -     Reviewed and updated as needed this visit by clinical staff                  Reviewed and updated as needed this visit by Provider                     Review of Systems   Constitutional: Negative for chills and fever.   HENT: Negative for congestion, ear pain, hearing loss and sore throat.    Eyes: Negative for pain and visual disturbance.   Respiratory: Negative for cough and shortness of breath.    Cardiovascular: Negative for chest pain, palpitations and peripheral edema.   Gastrointestinal: Negative for abdominal pain, constipation, diarrhea, heartburn, hematochezia and nausea.   Breasts:  Negative for tenderness, breast mass and discharge.   Genitourinary: Positive for pelvic pain. Negative for dysuria, frequency, genital sores, hematuria, urgency, vaginal bleeding and vaginal discharge.   Musculoskeletal: Positive for arthralgias. Negative for joint swelling and myalgias.   Skin: Negative for rash.   Neurological: Negative for dizziness, weakness, headaches and paresthesias.   Psychiatric/Behavioral: Negative for mood changes. The patient is not nervous/anxious.      OBJECTIVE:     Physical Exam  /78 (BP Location: Right arm, Patient Position: Sitting, Cuff Size: Adult Regular)   Pulse 69   Temp 97.2  F (36.2  C) (Temporal)   Resp 15   Ht 1.626 m (5' 4\")   Wt 88.9 kg (196 lb)   " LMP  (LMP Unknown)   SpO2 99%   Breastfeeding Yes   BMI 33.64 kg/m    GENERAL: healthy, alert and no distress  EYES: Eyes grossly normal to inspection, conjunctivae and sclerae normal  HENT: ear canals and TM's normal  NECK: no adenopathy, no asymmetry, masses, or scars and thyroid normal to palpation  RESP: lungs clear to auscultation - no rales, rhonchi or wheezes  CV: regular rate and rhythm, normal S1 S2  ABDOMEN: soft, nontender, no hepatosplenomegaly, no masses and bowel sounds normal  MS: no gross musculoskeletal defects noted, no edema, + right great toe tenderness   SKIN: no suspicious lesions or rashes  NEURO: Normal strength and tone, mentation intact and speech normal  PSYCH: mentation appears normal, affect normal/bright    Diagnostic Test Results:  Labs reviewed in Epic    ASSESSMENT/PLAN:       Routine general medical examination at a health care facility  - reviewed normal CBC, TSH and hemoglobin a1c 11/2022    Cervical cancer screening  - Recent normal pap done by midwife. Normal pap 11/8/22 done in [place]     Great toe pain, right  - xray showed mild arthritis  - recommended PT and if symptoms do not improve then will refer to podiatry   - XR Toe Right G/E 2 Views; Future  - Physical Therapy Referral; Future     Need for hepatitis C screening test  - low risk, defer testing    Lipid screening  - Lipid panel 11/2022 slightly elevated then baseline. Labs were done few months postpartum. Continue lifestyle changes and recheck fasting lipid panel in 6 months.   - Lipid panel reflex to direct LDL Fasting; Future  - PRIMARY CARE FOLLOW-UP SCHEDULING; Future    Encounter for screening mammogram for breast cancer  - MA Screening Digital Bilateral; Future    High priority for 2019-nCoV vaccine  - COVID-19,PF,PFIZER BOOSTER BIVALENT 12+Yrs    HAYLIE (generalized anxiety disorder)  Obsessive-compulsive disorder, unspecified type  - followed by psychiatrist/pscyhologist     Pain in both wrists  - likely  overuse  - recommended using wrist splints at night for up to 6 weeks  - Follow if symptoms worsen or fail to improve.    Patient has been advised of split billing requirements and indicates understanding: Yes      COUNSELING:  Reviewed preventive health counseling, as reflected in patient instructions        She reports that she has quit smoking. She has never used smokeless tobacco.          Brooklyn Umana MD  [facility]  Answers for HPI/ROS submitted by the patient on 1/20/2023  If you checked off any problems, how difficult have these problems made it for you to do your work, take care of things at home, or get along with other people?: Somewhat difficult  PHQ9 TOTAL SCORE: 5

## 2023-01-24 ENCOUNTER — ANCILLARY PROCEDURE (OUTPATIENT)
Dept: MAMMOGRAPHY | Facility: CLINIC | Age: 43
End: 2023-01-24
Attending: FAMILY MEDICINE
Payer: COMMERCIAL

## 2023-01-24 DIAGNOSIS — Z12.31 ENCOUNTER FOR SCREENING MAMMOGRAM FOR BREAST CANCER: ICD-10-CM

## 2023-01-24 PROCEDURE — 77067 SCR MAMMO BI INCL CAD: CPT | Mod: 26 | Performed by: STUDENT IN AN ORGANIZED HEALTH CARE EDUCATION/TRAINING PROGRAM

## 2023-01-24 PROCEDURE — 77067 SCR MAMMO BI INCL CAD: CPT

## 2023-03-24 ENCOUNTER — E-VISIT (OUTPATIENT)
Dept: FAMILY MEDICINE | Facility: CLINIC | Age: 43
End: 2023-03-24
Payer: COMMERCIAL

## 2023-03-24 DIAGNOSIS — Z20.818 STREPTOCOCCUS EXPOSURE: Primary | ICD-10-CM

## 2023-03-24 PROCEDURE — 99207 PR NON-BILLABLE SERV PER CHARTING: CPT | Performed by: FAMILY MEDICINE

## 2023-03-27 RX ORDER — PENICILLIN V POTASSIUM 500 MG/1
500 TABLET, FILM COATED ORAL 2 TIMES DAILY
Qty: 20 TABLET | Refills: 0 | Status: SHIPPED | OUTPATIENT
Start: 2023-03-27 | End: 2023-04-06

## 2023-03-28 NOTE — ADDENDUM NOTE
Addended by: CASANDRA LINDER Y on: 3/28/2023 04:57 PM     Modules accepted: Level of Service, SmartSet

## 2024-02-12 ENCOUNTER — MYC MEDICAL ADVICE (OUTPATIENT)
Dept: FAMILY MEDICINE | Facility: CLINIC | Age: 44
End: 2024-02-12
Payer: COMMERCIAL

## 2024-02-12 ENCOUNTER — NURSE TRIAGE (OUTPATIENT)
Dept: FAMILY MEDICINE | Facility: CLINIC | Age: 44
End: 2024-02-12
Payer: COMMERCIAL

## 2024-02-12 NOTE — TELEPHONE ENCOUNTER
"Scheduled for same day slot tomorrow.    Reason for Disposition   Swelling is painful to touch and no fever    Additional Information   Negative: Sounds like a life-threatening emergency to the triager   Negative: Small growth, spot, bump, or pigmented area of skin (e.g., moles, skin tags, wart, melanoma, skin cancer)   Negative: Inguinal hernia previously diagnosed by a doctor (or NP/PA)   Negative: Followed a skin injury   Negative: Followed an insect bite   Negative: Swelling of ankle joint   Negative: Swelling of entire face   Negative: Swelling of eyelid   Negative: Swelling of knee joint   Negative: Swelling of labia   Negative: Swelling of lymph node suspected   Negative: Swelling of rectum   Negative: Swelling of scrotum   Negative: Swelling of surgical incision   Negative: Swelling of vaccination site   Negative: Hernia suspected (bulge in groin or abdomen) and painful or vomiting   Negative: Swollen lump in groin and pulsating (like heartbeat)   Negative: Patient sounds very sick or weak to the triager   Negative: SEVERE pain (e.g., excruciating)   Negative: Swelling is painful to touch AND fever   Negative: Swelling is red and fever   Negative: Swelling is red and size > 2 inches (5.0 cm)    Answer Assessment - Initial Assessment Questions  1. APPEARANCE of SWELLING: \"What does it look like?\"      Lump at base of sternum  2. SIZE: \"How large is the swelling?\" (e.g., inches, cm; or compare to size of pinhead, tip of pen, eraser, coin, pea, grape, ping pong ball)       Pilgrim sized - hard  3. LOCATION: \"Where is the swelling located?\"      Base of sternum  4. ONSET: \"When did the swelling start?\"      One week ago  5. COLOR: \"What color is it?\" \"Is there more than one color?\"      Flesh colored  6. PAIN: \"Is there any pain?\" If Yes, ask: \"How bad is the pain?\" (e.g., scale 1-10; or mild, moderate, severe)      - NONE (0): no pain    - MILD (1-3): doesn't interfere with normal activities     - MODERATE " "(4-7): interferes with normal activities or awakens from sleep     - SEVERE (8-10): excruciating pain, unable to do any normal activities      Tender to touch  7. ITCH: \"Does it itch?\" If Yes, ask: \"How bad is the itch?\"       itching  8. CAUSE: \"What do you think caused the swelling?\"  9 OTHER SYMPTOMS: \"Do you have any other symptoms?\" (e.g., fever)      no    Protocols used: Skin Lump or Localized Swelling-A-OH    "

## 2024-02-13 ENCOUNTER — OFFICE VISIT (OUTPATIENT)
Dept: FAMILY MEDICINE | Facility: CLINIC | Age: 44
End: 2024-02-13
Payer: COMMERCIAL

## 2024-02-13 VITALS
BODY MASS INDEX: 25.89 KG/M2 | SYSTOLIC BLOOD PRESSURE: 109 MMHG | HEIGHT: 65 IN | RESPIRATION RATE: 18 BRPM | OXYGEN SATURATION: 97 % | WEIGHT: 155.4 LBS | TEMPERATURE: 97.9 F | DIASTOLIC BLOOD PRESSURE: 74 MMHG | HEART RATE: 93 BPM

## 2024-02-13 DIAGNOSIS — M79.89 MASS OF SOFT TISSUE OF CHEST: Primary | ICD-10-CM

## 2024-02-13 PROCEDURE — 99213 OFFICE O/P EST LOW 20 MIN: CPT | Performed by: FAMILY MEDICINE

## 2024-02-13 RX ORDER — LISDEXAMFETAMINE DIMESYLATE 20 MG/1
20 CAPSULE ORAL EVERY MORNING
COMMUNITY
Start: 2023-08-01

## 2024-02-13 RX ORDER — DULOXETIN HYDROCHLORIDE 60 MG/1
60 CAPSULE, DELAYED RELEASE ORAL DAILY
COMMUNITY
Start: 2023-02-12

## 2024-02-13 ASSESSMENT — PAIN SCALES - GENERAL: PAINLEVEL: NO PAIN (0)

## 2024-02-13 NOTE — PROGRESS NOTES
"  Assessment & Plan     Mass of soft tissue of chest  -Noticed 1 week ago, possibly lipoma  -Discussed US for eval. May need diagnostic mammo  - US Soft Tissue Chest Wall or Upper Back      Subjective   Gini is a 43 year old, presenting for the following health issues:  Mass      2/13/2024     1:43 PM   Additional Questions   Roomed by Christie MELISSA         2/13/2024     1:43 PM   Patient Reported Additional Medications   Patient reports taking the following new medications Reconcil Vyvanse 20MG and Cymbalta 60MG     Mass    History of Present Illness       Reason for visit:  Lump on chest  Symptom onset:  1-2 weeks ago  Symptoms include:  Hard lump on sternum  Symptom intensity:  Mild  Symptom progression:  Staying the same  Had these symptoms before:  No  What makes it worse:  Pushing on it  What makes it better:  Relieving anxiety    She eats 2-3 servings of fruits and vegetables daily.She consumes 1 sweetened beverage(s) daily.She exercises with enough effort to increase her heart rate 10 to 19 minutes per day.  She exercises with enough effort to increase her heart rate 3 or less days per week. She is missing 1 dose(s) of medications per week.  She is not taking prescribed medications regularly due to remembering to take.     1 week ago, noticed.   Not painful.  Last mammo 1 year ago was normal.                  Objective    /74 (BP Location: Right arm, Patient Position: Sitting, Cuff Size: Adult Regular)   Pulse 93   Temp 97.9  F (36.6  C) (Temporal)   Resp 18   Ht 1.645 m (5' 4.76\")   Wt 70.5 kg (155 lb 6.4 oz)   LMP 02/10/2024 (Approximate)   SpO2 97%   Breastfeeding Yes   BMI 26.05 kg/m    Body mass index is 26.05 kg/m .  Physical Exam   GENERAL: alert and no distress  MS: chest wall: ~2 cm soft mass over distal sternum             Signed Electronically by: Jean Phillips DO    "
Alert and oriented to person, place and time

## 2024-02-14 ENCOUNTER — ANCILLARY PROCEDURE (OUTPATIENT)
Dept: ULTRASOUND IMAGING | Facility: CLINIC | Age: 44
End: 2024-02-14
Attending: FAMILY MEDICINE
Payer: COMMERCIAL

## 2024-02-14 DIAGNOSIS — M79.89 MASS OF SOFT TISSUE OF CHEST: ICD-10-CM

## 2024-02-14 PROCEDURE — 76604 US EXAM CHEST: CPT | Mod: 52 | Performed by: RADIOLOGY

## 2024-04-28 ENCOUNTER — HEALTH MAINTENANCE LETTER (OUTPATIENT)
Age: 44
End: 2024-04-28

## 2024-05-30 ENCOUNTER — E-VISIT (OUTPATIENT)
Dept: FAMILY MEDICINE | Facility: CLINIC | Age: 44
End: 2024-05-30
Payer: COMMERCIAL

## 2024-05-30 DIAGNOSIS — R69 DIAGNOSIS DEFERRED: Primary | ICD-10-CM

## 2024-05-30 PROCEDURE — 99207 PR NON-BILLABLE SERV PER CHARTING: CPT | Performed by: FAMILY MEDICINE

## 2024-05-30 NOTE — PATIENT INSTRUCTIONS
Thank you for choosing us for your care. I think an urgent care or in-clinic or virtual visit would be the best next step based on your symptoms. Please schedule a clinic appointment; you won t be charged for this eVisit.      You can schedule an appointment by clicking here in [facility], or call [phone].

## 2024-12-26 ENCOUNTER — PATIENT OUTREACH (OUTPATIENT)
Dept: CARE COORDINATION | Facility: CLINIC | Age: 44
End: 2024-12-26
Payer: COMMERCIAL

## 2025-01-23 ENCOUNTER — PATIENT OUTREACH (OUTPATIENT)
Dept: CARE COORDINATION | Facility: CLINIC | Age: 45
End: 2025-01-23
Payer: COMMERCIAL

## 2025-01-27 ENCOUNTER — E-VISIT (OUTPATIENT)
Dept: FAMILY MEDICINE | Facility: CLINIC | Age: 45
End: 2025-01-27
Payer: COMMERCIAL

## 2025-01-27 DIAGNOSIS — M25.539 PAIN IN WRIST, UNSPECIFIED LATERALITY: Primary | ICD-10-CM

## 2025-01-29 NOTE — TELEPHONE ENCOUNTER
Action 01/30/25 9:00 AM AC   Action Taken  Imaging request to [facility] F:[phone]      Action 01/29/25 10:39 AM AC   Action Taken  Imaging request to [employer] F:[phone]     DIAGNOSIS: Pain in wrist, unspecified laterality [M25.539]/ Brooklyn Umana MD in [facility]    APPOINTMENT DATE: 1/31/25   NOTES STATUS DETAILS   OFFICE NOTE from referring provider Internal 1/27/25 - Brooklyn Umana MD - [facility]   OFFICE NOTE from other specialist Care Everywhere 10/5/18 - Derik Mack - [facility]   MEDICATION LIST Internal    XRAYS (IMAGES & REPORTS) In process 9/21/18 - XR Wrist Left   9/5/18 - XR Wrist Left

## 2025-01-30 DIAGNOSIS — M25.532 BILATERAL WRIST PAIN: Primary | ICD-10-CM

## 2025-01-30 DIAGNOSIS — M25.531 BILATERAL WRIST PAIN: Primary | ICD-10-CM

## 2025-01-31 ENCOUNTER — PRE VISIT (OUTPATIENT)
Dept: ORTHOPEDICS | Facility: CLINIC | Age: 45
End: 2025-01-31

## 2025-02-05 DIAGNOSIS — M25.539 WRIST PAIN, UNSPECIFIED LATERALITY: Primary | ICD-10-CM

## 2025-02-11 ENCOUNTER — OFFICE VISIT (OUTPATIENT)
Dept: ORTHOPEDICS | Facility: CLINIC | Age: 45
End: 2025-02-11
Attending: FAMILY MEDICINE
Payer: COMMERCIAL

## 2025-02-11 ENCOUNTER — ANCILLARY PROCEDURE (OUTPATIENT)
Dept: GENERAL RADIOLOGY | Facility: CLINIC | Age: 45
End: 2025-02-11
Attending: STUDENT IN AN ORGANIZED HEALTH CARE EDUCATION/TRAINING PROGRAM
Payer: COMMERCIAL

## 2025-02-11 DIAGNOSIS — M25.539 PAIN IN WRIST, UNSPECIFIED LATERALITY: ICD-10-CM

## 2025-02-11 DIAGNOSIS — M25.539 WRIST PAIN, UNSPECIFIED LATERALITY: ICD-10-CM

## 2025-02-11 PROCEDURE — 73110 X-RAY EXAM OF WRIST: CPT | Mod: GC | Performed by: RADIOLOGY

## 2025-02-11 PROCEDURE — 99203 OFFICE O/P NEW LOW 30 MIN: CPT | Performed by: STUDENT IN AN ORGANIZED HEALTH CARE EDUCATION/TRAINING PROGRAM

## 2025-02-11 NOTE — NURSING NOTE
Reason For Visit:   Chief Complaint   Patient presents with    Consult     Left Wrist pain fell in December 2024       Primary MD: Brooklyn Umana Y  Ref. MD: Brooklyn Umana    Age: 44 year old    ?  No      There were no vitals taken for this visit.      Pain Assessment  Patient Currently in Pain: Yes  0-10 Pain Scale: 3  Primary Pain Location: Wrist (left)  Pain Descriptors: Constant, Aching, Intermittent, Sharp    Hand Dominance Evaluation  Hand Dominance: Right          QuickDASH Assessment      2/11/2025     9:35 AM   QuickDASH Main   1. Open a tight or new jar Severe difficulty   2. Do heavy household chores (e.g., wash walls, floors) Moderate difficulty   3. Carry a shopping bag or briefcase Mild difficulty   4. Wash your back Mild difficulty   5. Use a knife to cut food Moderate difficulty   6. Recreational activities in which you take some force or impact through your arm, shoulder or hand (e.g., golf, hammering, tennis, etc.) Moderate difficulty   7. During the past week, to what extent has your arm, shoulder or hand problem interfered with your normal social activities with family, friends, neighbours or groups Moderately   8. During the past week, were you limited in your work or other regular daily activities as a result of your arm, shoulder or hand problem Moderately limited   9. Arm, shoulder or hand pain Moderate   10.Tingling (pins and needles) in your arm,shoulder or hand Moderate   11. During the past week, how much difficulty have you had sleeping because of the pain in your arm, shoulder or hand Moderate difficulty   Quickdash Ability Score 47.73          Current Outpatient Medications   Medication Sig Dispense Refill    DULoxetine (CYMBALTA) 60 MG capsule Take 60 mg by mouth daily      lisdexamfetamine (VYVANSE) 20 MG capsule Take 20 mg by mouth every morning         Allergies   Allergen Reactions    Seasonal Allergies      congestion       Lissett Godwin, ATC

## 2025-02-11 NOTE — LETTER
2/11/2025      Gini Horne  [address] [id number]      Dear Colleague,    Thank you for referring your patient, Gini Horne, to the [facility]. Please see a copy of my visit note below.    Ortho Hand    HPI: 44-year-old right-hand-dominant social smoker presenting with left wrist pain since December.  She did sustain a fall and braced herself.  She had sharp pain along the left wrist that is improved over time.  The pain worsens with strong  activities.  She has splinted.  In fact, the pain is very minimal today.    ROS: Negative, see HPI  PMH: Nondiabetic  PSH: No surgeries to the hands, wrists and/or elbows  Medications: No blood thinners  Allergies: None  SH: Nonsmoker, denies any tobacco or nicotine use  FH: No bleeding or clotting issues, or problems with anesthesia    Examination:  Nonlabored breathing  Not distressed  No left wrist deformity  Perhaps slightly more swelling on the left wrist as compared to the right wrist over the fourth extensor compartment  Left wrist active extension of 65 degrees, flexion of 60 degrees, left forearm active supination of 90 degrees, pronation of 90 degrees  No tenderness over the left first extensor compartment, snuffbox, scaphoid tubercle, thumb CMC and STT joints, SL interval, fourth extensor compartment, proximal capitate, LT interval with no ballottement, ECU tendon with no subluxation, ulnar fovea, pisotriquetrum, DRUJ with no laxity in any forearm position  Only mild tenderness over the left second extensor compartment     XR: No obvious fracture or dislocation, but there is note of a subtle cortical irregularity of the distal radius    A/P: 44-year-old female right-hand-dominant non-smoker presenting with left wrist pain consistent with second extensor compartment tendinitis    -Since the patient has steadily improved and there is no significant tenderness on exam today, my recommendation is for the  patient to resume activities.  If there is worsening pain, patient should return to clinic for reevaluation.  Patient is agreeable with the plan.  -A total of 30 minutes was devoted to review of chart, direct face-to-face patient counseling and documentation during and on the day of this encounter, exclusive of any procedure performed.    Oc Rodas MD, PhD, FACS        Again, thank you for allowing me to participate in the care of your patient.        Sincerely,        Oc Rodas MD    Electronically signed

## 2025-02-13 NOTE — PROGRESS NOTES
Ortho Hand    HPI: 44-year-old right-hand-dominant social smoker presenting with left wrist pain since December.  She did sustain a fall and braced herself.  She had sharp pain along the left wrist that is improved over time.  The pain worsens with strong  activities.  She has splinted.  In fact, the pain is very minimal today.    ROS: Negative, see HPI  PMH: Nondiabetic  PSH: No surgeries to the hands, wrists and/or elbows  Medications: No blood thinners  Allergies: None  SH: Nonsmoker, denies any tobacco or nicotine use  FH: No bleeding or clotting issues, or problems with anesthesia    Examination:  Nonlabored breathing  Not distressed  No left wrist deformity  Perhaps slightly more swelling on the left wrist as compared to the right wrist over the fourth extensor compartment  Left wrist active extension of 65 degrees, flexion of 60 degrees, left forearm active supination of 90 degrees, pronation of 90 degrees  No tenderness over the left first extensor compartment, snuffbox, scaphoid tubercle, thumb CMC and STT joints, SL interval, fourth extensor compartment, proximal capitate, LT interval with no ballottement, ECU tendon with no subluxation, ulnar fovea, pisotriquetrum, DRUJ with no laxity in any forearm position  Only mild tenderness over the left second extensor compartment     XR: No obvious fracture or dislocation, but there is note of a subtle cortical irregularity of the distal radius    A/P: 44-year-old female right-hand-dominant non-smoker presenting with left wrist pain consistent with second extensor compartment tendinitis    -Since the patient has steadily improved and there is no significant tenderness on exam today, my recommendation is for the patient to resume activities.  If there is worsening pain, patient should return to clinic for reevaluation.  Patient is agreeable with the plan.  -A total of 30 minutes was devoted to review of chart, direct face-to-face patient counseling and  documentation during and on the day of this encounter, exclusive of any procedure performed.    Oc Rodas MD, PhD, FACS

## 2025-03-30 ENCOUNTER — HEALTH MAINTENANCE LETTER (OUTPATIENT)
Age: 45
End: 2025-03-30

## 2025-05-11 ENCOUNTER — HEALTH MAINTENANCE LETTER (OUTPATIENT)
Age: 45
End: 2025-05-11

## 2025-07-23 ENCOUNTER — PATIENT OUTREACH (OUTPATIENT)
Dept: CARE COORDINATION | Facility: CLINIC | Age: 45
End: 2025-07-23
Payer: COMMERCIAL

## 2025-08-11 ENCOUNTER — PATIENT OUTREACH (OUTPATIENT)
Dept: CARE COORDINATION | Facility: CLINIC | Age: 45
End: 2025-08-11
Payer: COMMERCIAL